# Patient Record
Sex: FEMALE | Race: WHITE | NOT HISPANIC OR LATINO | URBAN - METROPOLITAN AREA
[De-identification: names, ages, dates, MRNs, and addresses within clinical notes are randomized per-mention and may not be internally consistent; named-entity substitution may affect disease eponyms.]

---

## 2020-10-13 ENCOUNTER — OFFICE VISIT (OUTPATIENT)
Dept: URBAN - METROPOLITAN AREA CLINIC 98 | Facility: CLINIC | Age: 80
End: 2020-10-13
Payer: MEDICARE

## 2020-10-13 ENCOUNTER — WEB ENCOUNTER (OUTPATIENT)
Dept: URBAN - METROPOLITAN AREA CLINIC 98 | Facility: CLINIC | Age: 80
End: 2020-10-13

## 2020-10-13 VITALS
WEIGHT: 109.4 LBS | TEMPERATURE: 97.2 F | BODY MASS INDEX: 20.65 KG/M2 | DIASTOLIC BLOOD PRESSURE: 72 MMHG | SYSTOLIC BLOOD PRESSURE: 121 MMHG | HEART RATE: 71 BPM | HEIGHT: 61 IN

## 2020-10-13 DIAGNOSIS — K20.90 ESOPHAGITIS: ICD-10-CM

## 2020-10-13 DIAGNOSIS — Z12.11 COLON CANCER SCREENING: ICD-10-CM

## 2020-10-13 DIAGNOSIS — K21.9 GERD: ICD-10-CM

## 2020-10-13 DIAGNOSIS — K58.9 IBS (IRRITABLE BOWEL SYNDROME): ICD-10-CM

## 2020-10-13 DIAGNOSIS — D64.9 ANEMIA: ICD-10-CM

## 2020-10-13 DIAGNOSIS — K92.1 BLOOD IN STOOL: ICD-10-CM

## 2020-10-13 DIAGNOSIS — R19.7 DIARRHEA: ICD-10-CM

## 2020-10-13 PROCEDURE — G9903 PT SCRN TBCO ID AS NON USER: HCPCS | Performed by: INTERNAL MEDICINE

## 2020-10-13 PROCEDURE — G9622 NO UNHEAL ETOH USER: HCPCS | Performed by: INTERNAL MEDICINE

## 2020-10-13 PROCEDURE — G8427 DOCREV CUR MEDS BY ELIG CLIN: HCPCS | Performed by: INTERNAL MEDICINE

## 2020-10-13 PROCEDURE — 99214 OFFICE O/P EST MOD 30 MIN: CPT | Performed by: INTERNAL MEDICINE

## 2020-10-13 PROCEDURE — G8482 FLU IMMUNIZE ORDER/ADMIN: HCPCS | Performed by: INTERNAL MEDICINE

## 2020-10-13 PROCEDURE — G8420 CALC BMI NORM PARAMETERS: HCPCS | Performed by: INTERNAL MEDICINE

## 2020-10-13 PROCEDURE — 3017F COLORECTAL CA SCREEN DOC REV: CPT | Performed by: INTERNAL MEDICINE

## 2020-10-13 RX ORDER — PITAVASTATIN CALCIUM 2.09 MG/1
TABLET, FILM COATED ORAL
Qty: 0 | Refills: 0 | Status: ACTIVE | COMMUNITY
Start: 1900-01-01

## 2020-10-13 RX ORDER — DEXLANSOPRAZOLE 60 MG/1
1 CAPSULE CAPSULE, DELAYED RELEASE ORAL ONCE A DAY
Qty: 90 | Refills: 3 | OUTPATIENT
Start: 2020-10-13

## 2020-10-13 RX ORDER — VALACYCLOVIR HCL 500 MG
TABLET ORAL
Qty: 0 | Refills: 0 | Status: ACTIVE | COMMUNITY
Start: 1900-01-01

## 2020-10-13 RX ORDER — LOSARTAN POTASSIUM 25 MG/1
TABLET, FILM COATED ORAL
Qty: 0 | Refills: 0 | Status: ACTIVE | COMMUNITY
Start: 1900-01-01

## 2020-10-13 RX ORDER — ASPIRIN 81 MG/1
TABLET, CHEWABLE ORAL
Qty: 0 | Refills: 0 | Status: ACTIVE | COMMUNITY
Start: 1900-01-01

## 2020-10-13 RX ORDER — MOMETASONE FUROATE AND FORMOTEROL FUMARATE DIHYDRATE 100; 5 UG/1; UG/1
AEROSOL RESPIRATORY (INHALATION)
Qty: 0 | Refills: 0 | Status: ACTIVE | COMMUNITY
Start: 1900-01-01

## 2020-10-13 NOTE — HPI-TODAY'S VISIT:
f/u visit. Had concerns Nexium was causing loose stools, and tried to stop, however, she is now back on it Interested in Dexilant Started some benefiber    . PRIOR VISIT: Recently had blood work with Dr. Smyth. Reports labs mostly normal Taking Nexium daily Also reports she is taking meds with full glass of water to prevent pill esophagitis Has loose stools depending on her diet Overall, she feels well . PRIOR VISIT: Today she reports doing fairly well. Taking Nexium daily She alternates b/w constipation and diarrhea She does report that a few weeks ago she coughed up blood once PRIOR VISIT: Reports having 3-5 loose stools daily Has kept a food journal. Thinks she has lactose intolerance No incontinence +ve urgency Has had an episode of red blood No eugene BRBPR Tried benefiber for, but this increased her stools PRIOR VISIT: After last visit, Hgb 9.7, which was stable from hospital discharge. No rectal bleeding Feeling much better Drinking plenty of fluid with pills Taking iron supplement On PPI once daily, but reports she wants to stop the medication as she does not like PPI. She alternates between constipation and loose stools. Keeping a food journal, but has not identified inciting factors. Has not eliminated dairly products. More frequent constipation than loose stools. She had an episode of incontinence after several doses of miralax. PRIOR VISIT: [facility] GERD, HH, skin cancer, and MI Recently at [facility] with symptomatic anemia EGD with LA D esophagitis. ? reflux vs possible pill esophagitis Barium swallow with moderate HH She started BID PPI and Carafate. Stopped Carafate due to "tongue reaction" She has decreased PPI to once daily due to concerns it can cause diarrhea She has been feeling well since discharge. Avoiding inciting foods. No melena She now feels constipated on once daily PPI Drinking plenty of fluids with pills

## 2020-12-15 ENCOUNTER — OFFICE VISIT (OUTPATIENT)
Dept: URBAN - METROPOLITAN AREA CLINIC 98 | Facility: CLINIC | Age: 80
End: 2020-12-15

## 2020-12-23 ENCOUNTER — OFFICE VISIT (OUTPATIENT)
Dept: URBAN - METROPOLITAN AREA CLINIC 115 | Facility: CLINIC | Age: 80
End: 2020-12-23

## 2021-04-06 ENCOUNTER — OFFICE VISIT (OUTPATIENT)
Dept: URBAN - METROPOLITAN AREA CLINIC 98 | Facility: CLINIC | Age: 81
End: 2021-04-06

## 2021-04-13 ENCOUNTER — OFFICE VISIT (OUTPATIENT)
Dept: URBAN - METROPOLITAN AREA CLINIC 98 | Facility: CLINIC | Age: 81
End: 2021-04-13
Payer: MEDICARE

## 2021-04-13 VITALS
SYSTOLIC BLOOD PRESSURE: 164 MMHG | WEIGHT: 110.8 LBS | TEMPERATURE: 96.9 F | HEART RATE: 66 BPM | BODY MASS INDEX: 20.92 KG/M2 | DIASTOLIC BLOOD PRESSURE: 67 MMHG | HEIGHT: 61 IN

## 2021-04-13 DIAGNOSIS — K58.9 IBS (IRRITABLE BOWEL SYNDROME): ICD-10-CM

## 2021-04-13 DIAGNOSIS — K62.5 BRBPR (BRIGHT RED BLOOD PER RECTUM): ICD-10-CM

## 2021-04-13 DIAGNOSIS — Z12.11 COLON CANCER SCREENING: ICD-10-CM

## 2021-04-13 DIAGNOSIS — D64.9 ANEMIA: ICD-10-CM

## 2021-04-13 DIAGNOSIS — R19.7 DIARRHEA: ICD-10-CM

## 2021-04-13 DIAGNOSIS — K92.1 BLOOD IN STOOL: ICD-10-CM

## 2021-04-13 DIAGNOSIS — K21.9 GERD: ICD-10-CM

## 2021-04-13 DIAGNOSIS — K20.90 ESOPHAGITIS: ICD-10-CM

## 2021-04-13 PROCEDURE — G9903 PT SCRN TBCO ID AS NON USER: HCPCS | Performed by: INTERNAL MEDICINE

## 2021-04-13 PROCEDURE — G8420 CALC BMI NORM PARAMETERS: HCPCS | Performed by: INTERNAL MEDICINE

## 2021-04-13 PROCEDURE — G8482 FLU IMMUNIZE ORDER/ADMIN: HCPCS | Performed by: INTERNAL MEDICINE

## 2021-04-13 PROCEDURE — G8427 DOCREV CUR MEDS BY ELIG CLIN: HCPCS | Performed by: INTERNAL MEDICINE

## 2021-04-13 PROCEDURE — 99214 OFFICE O/P EST MOD 30 MIN: CPT | Performed by: INTERNAL MEDICINE

## 2021-04-13 PROCEDURE — 3017F COLORECTAL CA SCREEN DOC REV: CPT | Performed by: INTERNAL MEDICINE

## 2021-04-13 RX ORDER — VALACYCLOVIR HCL 500 MG
TABLET ORAL
Qty: 0 | Refills: 0 | Status: ACTIVE | COMMUNITY
Start: 1900-01-01

## 2021-04-13 RX ORDER — MOMETASONE FUROATE AND FORMOTEROL FUMARATE DIHYDRATE 100; 5 UG/1; UG/1
AEROSOL RESPIRATORY (INHALATION)
Qty: 0 | Refills: 0 | Status: ACTIVE | COMMUNITY
Start: 1900-01-01

## 2021-04-13 RX ORDER — PITAVASTATIN CALCIUM 2.09 MG/1
TABLET, FILM COATED ORAL
Qty: 0 | Refills: 0 | Status: ACTIVE | COMMUNITY
Start: 1900-01-01

## 2021-04-13 RX ORDER — ASPIRIN 81 MG/1
TABLET, CHEWABLE ORAL
Qty: 0 | Refills: 0 | Status: ACTIVE | COMMUNITY
Start: 1900-01-01

## 2021-04-13 RX ORDER — LOSARTAN POTASSIUM 25 MG/1
TABLET, FILM COATED ORAL
Qty: 0 | Refills: 0 | Status: ACTIVE | COMMUNITY
Start: 1900-01-01

## 2021-04-13 RX ORDER — DEXLANSOPRAZOLE 60 MG/1
1 CAPSULE CAPSULE, DELAYED RELEASE ORAL ONCE A DAY
Qty: 90 | Refills: 3 | Status: ACTIVE | COMMUNITY
Start: 2020-10-13

## 2021-04-13 NOTE — HPI-TODAY'S VISIT:
f/u visit. At last visit we discuss Dexilant, however, she decided to stay with Nexium In March she had an episode of BRBPR.   She was having multiple episodes of loose stool, and then saw blood.   No further episdoes No pain she can recall She is unsure when last colonoscopy     . PRIOR VISIT: Had concerns Nexium was causing loose stools, and tried to stop, however, she is now back on it Interested in Dexilant Started some benefiber    . PRIOR VISIT: Recently had blood work with Dr. Smyth. Reports labs mostly normal Taking Nexium daily Also reports she is taking meds with full glass of water to prevent pill esophagitis Has loose stools depending on her diet Overall, she feels well . PRIOR VISIT: Today she reports doing fairly well. Taking Nexium daily She alternates b/w constipation and diarrhea She does report that a few weeks ago she coughed up blood once PRIOR VISIT: Reports having 3-5 loose stools daily Has kept a food journal. Thinks she has lactose intolerance No incontinence +ve urgency Has had an episode of red blood No eugene BRBPR Tried benefiber for, but this increased her stools . PRIOR VISIT: After last visit, Hgb 9.7, which was stable from hospital discharge. No rectal bleeding Feeling much better Drinking plenty of fluid with pills Taking iron supplement On PPI once daily, but reports she wants to stop the medication as she does not like PPI. She alternates between constipation and loose stools. Keeping a food journal, but has not identified inciting factors. Has not eliminated dairly products. More frequent constipation than loose stools. She had an episode of incontinence after several doses of miralax. . PRIOR VISIT: [facility] GERD, HH, skin cancer, and MI Recently at [facility] with symptomatic anemia EGD with LA D esophagitis. ? reflux vs possible pill esophagitis Barium swallow with moderate HH She started BID PPI and Carafate. Stopped Carafate due to "tongue reaction" She has decreased PPI to once daily due to concerns it can cause diarrhea She has been feeling well since discharge. Avoiding inciting foods. No melena She now feels constipated on once daily PPI Drinking plenty of fluids with pills

## 2021-04-23 ENCOUNTER — TELEPHONE ENCOUNTER (OUTPATIENT)
Dept: URBAN - METROPOLITAN AREA CLINIC 6 | Facility: CLINIC | Age: 81
End: 2021-04-23

## 2021-04-23 RX ORDER — LOSARTAN POTASSIUM 25 MG/1
TABLET, FILM COATED ORAL
Qty: 0 | Refills: 0 | Status: ACTIVE | COMMUNITY
Start: 1900-01-01

## 2021-04-23 RX ORDER — POLYETHYLENE GLYCOL 3350, SODIUM SULFATE, SODIUM CHLORIDE, POTASSIUM CHLORIDE, ASCORBIC ACID, SODIUM ASCORBATE 140-9-5.2G
AS DIRECTED KIT ORAL
Qty: 1 KIT | Refills: 0 | OUTPATIENT
Start: 2021-04-28 | End: 2021-04-29

## 2021-04-23 RX ORDER — ASPIRIN 81 MG/1
TABLET, CHEWABLE ORAL
Qty: 0 | Refills: 0 | Status: ACTIVE | COMMUNITY
Start: 1900-01-01

## 2021-04-23 RX ORDER — VALACYCLOVIR HCL 500 MG
TABLET ORAL
Qty: 0 | Refills: 0 | Status: ACTIVE | COMMUNITY
Start: 1900-01-01

## 2021-04-23 RX ORDER — DEXLANSOPRAZOLE 60 MG/1
1 CAPSULE CAPSULE, DELAYED RELEASE ORAL ONCE A DAY
Qty: 90 | Refills: 3 | Status: ACTIVE | COMMUNITY
Start: 2020-10-13

## 2021-04-23 RX ORDER — MOMETASONE FUROATE AND FORMOTEROL FUMARATE DIHYDRATE 100; 5 UG/1; UG/1
AEROSOL RESPIRATORY (INHALATION)
Qty: 0 | Refills: 0 | Status: ACTIVE | COMMUNITY
Start: 1900-01-01

## 2021-04-23 RX ORDER — PITAVASTATIN CALCIUM 2.09 MG/1
TABLET, FILM COATED ORAL
Qty: 0 | Refills: 0 | Status: ACTIVE | COMMUNITY
Start: 1900-01-01

## 2021-04-28 ENCOUNTER — LAB OUTSIDE AN ENCOUNTER (OUTPATIENT)
Dept: URBAN - METROPOLITAN AREA CLINIC 6 | Facility: CLINIC | Age: 81
End: 2021-04-28

## 2021-04-30 PROBLEM — 74474003: Status: ACTIVE | Noted: 2021-04-13

## 2021-05-14 ENCOUNTER — OFFICE VISIT (OUTPATIENT)
Dept: URBAN - METROPOLITAN AREA MEDICAL CENTER 28 | Facility: MEDICAL CENTER | Age: 81
End: 2021-05-14
Payer: MEDICARE

## 2021-05-14 ENCOUNTER — TELEPHONE ENCOUNTER (OUTPATIENT)
Dept: URBAN - METROPOLITAN AREA CLINIC 23 | Facility: CLINIC | Age: 81
End: 2021-05-14

## 2021-05-14 DIAGNOSIS — K62.5 ANAL BLEEDING: ICD-10-CM

## 2021-05-14 DIAGNOSIS — K20.80 ESOPHAGITIS, LOS ANGELES GRADE A: ICD-10-CM

## 2021-05-14 DIAGNOSIS — K31.89 ACQUIRED DEFORMITY OF DUODENUM: ICD-10-CM

## 2021-05-14 PROCEDURE — 45378 DIAGNOSTIC COLONOSCOPY: CPT | Performed by: INTERNAL MEDICINE

## 2021-05-14 PROCEDURE — 43239 EGD BIOPSY SINGLE/MULTIPLE: CPT | Performed by: INTERNAL MEDICINE

## 2021-05-14 RX ORDER — VALACYCLOVIR HCL 500 MG
TABLET ORAL
Qty: 0 | Refills: 0 | Status: ACTIVE | COMMUNITY
Start: 1900-01-01

## 2021-05-14 RX ORDER — MOMETASONE FUROATE AND FORMOTEROL FUMARATE DIHYDRATE 100; 5 UG/1; UG/1
AEROSOL RESPIRATORY (INHALATION)
Qty: 0 | Refills: 0 | Status: ACTIVE | COMMUNITY
Start: 1900-01-01

## 2021-05-14 RX ORDER — ASPIRIN 81 MG/1
TABLET, CHEWABLE ORAL
Qty: 0 | Refills: 0 | Status: ACTIVE | COMMUNITY
Start: 1900-01-01

## 2021-05-14 RX ORDER — DEXLANSOPRAZOLE 60 MG/1
1 CAPSULE CAPSULE, DELAYED RELEASE ORAL ONCE A DAY
Qty: 90 | Refills: 3 | Status: ACTIVE | COMMUNITY
Start: 2020-10-13

## 2021-05-14 RX ORDER — ESOMEPRAZOLE MAGNESIUM 40 MG/1
1 CAPSULE CAPSULE, DELAYED RELEASE ORAL
Qty: 180 | Refills: 3 | OUTPATIENT
Start: 2021-05-14

## 2021-05-14 RX ORDER — PITAVASTATIN CALCIUM 2.09 MG/1
TABLET, FILM COATED ORAL
Qty: 0 | Refills: 0 | Status: ACTIVE | COMMUNITY
Start: 1900-01-01

## 2021-05-14 RX ORDER — LOSARTAN POTASSIUM 25 MG/1
TABLET, FILM COATED ORAL
Qty: 0 | Refills: 0 | Status: ACTIVE | COMMUNITY
Start: 1900-01-01

## 2021-05-17 ENCOUNTER — TELEPHONE ENCOUNTER (OUTPATIENT)
Dept: URBAN - METROPOLITAN AREA CLINIC 98 | Facility: CLINIC | Age: 81
End: 2021-05-17

## 2021-06-07 ENCOUNTER — OFFICE VISIT (OUTPATIENT)
Dept: URBAN - METROPOLITAN AREA CLINIC 98 | Facility: CLINIC | Age: 81
End: 2021-06-07
Payer: MEDICARE

## 2021-06-07 VITALS
SYSTOLIC BLOOD PRESSURE: 182 MMHG | WEIGHT: 106 LBS | HEIGHT: 61 IN | HEART RATE: 66 BPM | DIASTOLIC BLOOD PRESSURE: 75 MMHG | TEMPERATURE: 97.9 F | BODY MASS INDEX: 20.01 KG/M2

## 2021-06-07 DIAGNOSIS — D64.9 ANEMIA: ICD-10-CM

## 2021-06-07 DIAGNOSIS — K92.1 BLOOD IN STOOL: ICD-10-CM

## 2021-06-07 DIAGNOSIS — K21.9 GERD: ICD-10-CM

## 2021-06-07 DIAGNOSIS — K58.9 IBS (IRRITABLE BOWEL SYNDROME): ICD-10-CM

## 2021-06-07 DIAGNOSIS — K20.90 ESOPHAGITIS: ICD-10-CM

## 2021-06-07 DIAGNOSIS — R19.7 DIARRHEA: ICD-10-CM

## 2021-06-07 DIAGNOSIS — Z12.11 COLON CANCER SCREENING: ICD-10-CM

## 2021-06-07 PROCEDURE — G8420 CALC BMI NORM PARAMETERS: HCPCS | Performed by: INTERNAL MEDICINE

## 2021-06-07 PROCEDURE — G9903 PT SCRN TBCO ID AS NON USER: HCPCS | Performed by: INTERNAL MEDICINE

## 2021-06-07 PROCEDURE — G8427 DOCREV CUR MEDS BY ELIG CLIN: HCPCS | Performed by: INTERNAL MEDICINE

## 2021-06-07 PROCEDURE — G8482 FLU IMMUNIZE ORDER/ADMIN: HCPCS | Performed by: INTERNAL MEDICINE

## 2021-06-07 PROCEDURE — 99213 OFFICE O/P EST LOW 20 MIN: CPT | Performed by: INTERNAL MEDICINE

## 2021-06-07 PROCEDURE — 3017F COLORECTAL CA SCREEN DOC REV: CPT | Performed by: INTERNAL MEDICINE

## 2021-06-07 RX ORDER — ESOMEPRAZOLE MAGNESIUM 40 MG/1
1 CAPSULE CAPSULE, DELAYED RELEASE ORAL ONCE A DAY
Status: ACTIVE | COMMUNITY

## 2021-06-07 RX ORDER — ASPIRIN 81 MG/1
TABLET, CHEWABLE ORAL
Qty: 0 | Refills: 0 | Status: ACTIVE | COMMUNITY
Start: 1900-01-01

## 2021-06-07 RX ORDER — MOMETASONE FUROATE AND FORMOTEROL FUMARATE DIHYDRATE 100; 5 UG/1; UG/1
AEROSOL RESPIRATORY (INHALATION)
Qty: 0 | Refills: 0 | Status: ACTIVE | COMMUNITY
Start: 1900-01-01

## 2021-06-07 RX ORDER — VALACYCLOVIR HCL 500 MG
TABLET ORAL
Qty: 0 | Refills: 0 | Status: ACTIVE | COMMUNITY
Start: 1900-01-01

## 2021-06-07 RX ORDER — LOSARTAN POTASSIUM 25 MG/1
TABLET, FILM COATED ORAL
Qty: 0 | Refills: 0 | Status: ON HOLD | COMMUNITY
Start: 1900-01-01

## 2021-06-07 RX ORDER — PITAVASTATIN CALCIUM 2.09 MG/1
TABLET, FILM COATED ORAL
Qty: 0 | Refills: 0 | Status: ACTIVE | COMMUNITY
Start: 1900-01-01

## 2021-06-07 RX ORDER — ESOMEPRAZOLE MAGNESIUM 40 MG/1
1 CAPSULE CAPSULE, DELAYED RELEASE ORAL
Qty: 180 | Refills: 3 | Status: ON HOLD | COMMUNITY
Start: 2021-05-14

## 2021-06-07 RX ORDER — DEXLANSOPRAZOLE 60 MG/1
1 CAPSULE CAPSULE, DELAYED RELEASE ORAL ONCE A DAY
Qty: 90 | Refills: 3 | Status: ON HOLD | COMMUNITY
Start: 2020-10-13

## 2021-06-07 NOTE — HPI-TODAY'S VISIT:
f/u visit. She is feeling very well since changing her Losartan to Amlodipine EGD/Colonoscopy reviewed.  LA C esophagitis was seen She is taking Nexium once a day Colonoscopy unremarkable     . PRIOR VISIT: At last visit we discuss Dexilant, however, she decided to stay with Nexium In March she had an episode of BRBPR.   She was having multiple episodes of loose stool, and then saw blood.   No further episdoes No pain she can recall She is unsure when last colonoscopy     . PRIOR VISIT: Had concerns Nexium was causing loose stools, and tried to stop, however, she is now back on it Interested in Dexilant Started some benefiber    . PRIOR VISIT: Recently had blood work with Dr. Smyth. Reports labs mostly normal Taking Nexium daily Also reports she is taking meds with full glass of water to prevent pill esophagitis Has loose stools depending on her diet Overall, she feels well . PRIOR VISIT: Today she reports doing fairly well. Taking Nexium daily She alternates b/w constipation and diarrhea She does report that a few weeks ago she coughed up blood once PRIOR VISIT: Reports having 3-5 loose stools daily Has kept a food journal. Thinks she has lactose intolerance No incontinence +ve urgency Has had an episode of red blood No eugene BRBPR Tried benefiber for, but this increased her stools . PRIOR VISIT: After last visit, Hgb 9.7, which was stable from hospital discharge. No rectal bleeding Feeling much better Drinking plenty of fluid with pills Taking iron supplement On PPI once daily, but reports she wants to stop the medication as she does not like PPI. She alternates between constipation and loose stools. Keeping a food journal, but has not identified inciting factors. Has not eliminated dairly products. More frequent constipation than loose stools. She had an episode of incontinence after several doses of miralax. . PRIOR VISIT: [facility] GERD, HH, skin cancer, and MI Recently at [facility] with symptomatic anemia EGD with LA D esophagitis. ? reflux vs possible pill esophagitis Barium swallow with moderate HH She started BID PPI and Carafate. Stopped Carafate due to "tongue reaction" She has decreased PPI to once daily due to concerns it can cause diarrhea She has been feeling well since discharge. Avoiding inciting foods. No melena She now feels constipated on once daily PPI Drinking plenty of fluids with pills

## 2022-06-06 ENCOUNTER — WEB ENCOUNTER (OUTPATIENT)
Dept: URBAN - METROPOLITAN AREA CLINIC 98 | Facility: CLINIC | Age: 82
End: 2022-06-06

## 2022-06-06 ENCOUNTER — OFFICE VISIT (OUTPATIENT)
Dept: URBAN - METROPOLITAN AREA CLINIC 98 | Facility: CLINIC | Age: 82
End: 2022-06-06
Payer: MEDICARE

## 2022-06-06 VITALS
DIASTOLIC BLOOD PRESSURE: 62 MMHG | HEART RATE: 65 BPM | SYSTOLIC BLOOD PRESSURE: 124 MMHG | BODY MASS INDEX: 20.42 KG/M2 | TEMPERATURE: 97.1 F | HEIGHT: 60 IN | WEIGHT: 104 LBS

## 2022-06-06 DIAGNOSIS — K59.00 ACUTE CONSTIPATION: ICD-10-CM

## 2022-06-06 DIAGNOSIS — K92.1 BLOOD IN STOOL: ICD-10-CM

## 2022-06-06 DIAGNOSIS — K21.9 GERD: ICD-10-CM

## 2022-06-06 DIAGNOSIS — R19.7 DIARRHEA: ICD-10-CM

## 2022-06-06 DIAGNOSIS — K20.90 ESOPHAGITIS: ICD-10-CM

## 2022-06-06 DIAGNOSIS — K58.9 IBS (IRRITABLE BOWEL SYNDROME): ICD-10-CM

## 2022-06-06 DIAGNOSIS — Z12.11 COLON CANCER SCREENING: ICD-10-CM

## 2022-06-06 DIAGNOSIS — D64.9 ANEMIA: ICD-10-CM

## 2022-06-06 PROCEDURE — G8427 DOCREV CUR MEDS BY ELIG CLIN: HCPCS | Performed by: INTERNAL MEDICINE

## 2022-06-06 PROCEDURE — 3017F COLORECTAL CA SCREEN DOC REV: CPT | Performed by: INTERNAL MEDICINE

## 2022-06-06 PROCEDURE — G8482 FLU IMMUNIZE ORDER/ADMIN: HCPCS | Performed by: INTERNAL MEDICINE

## 2022-06-06 PROCEDURE — G9903 PT SCRN TBCO ID AS NON USER: HCPCS | Performed by: INTERNAL MEDICINE

## 2022-06-06 PROCEDURE — G8420 CALC BMI NORM PARAMETERS: HCPCS | Performed by: INTERNAL MEDICINE

## 2022-06-06 PROCEDURE — 99213 OFFICE O/P EST LOW 20 MIN: CPT | Performed by: INTERNAL MEDICINE

## 2022-06-06 RX ORDER — VALACYCLOVIR HCL 500 MG
TABLET ORAL
Qty: 0 | Refills: 0 | Status: ACTIVE | COMMUNITY
Start: 1900-01-01

## 2022-06-06 RX ORDER — LOSARTAN POTASSIUM 25 MG/1
TABLET, FILM COATED ORAL
Qty: 0 | Refills: 0 | Status: ON HOLD | COMMUNITY
Start: 1900-01-01

## 2022-06-06 RX ORDER — ESOMEPRAZOLE MAGNESIUM 40 MG/1
1 CAPSULE CAPSULE, DELAYED RELEASE ORAL
Qty: 180 | Refills: 3 | Status: ON HOLD | COMMUNITY
Start: 2021-05-14

## 2022-06-06 RX ORDER — DEXLANSOPRAZOLE 60 MG/1
1 CAPSULE CAPSULE, DELAYED RELEASE ORAL ONCE A DAY
Qty: 90 | Refills: 3 | Status: ON HOLD | COMMUNITY
Start: 2020-10-13

## 2022-06-06 RX ORDER — ESOMEPRAZOLE MAGNESIUM 40 MG/1
1 CAPSULE CAPSULE, DELAYED RELEASE ORAL ONCE A DAY
Status: ACTIVE | COMMUNITY

## 2022-06-06 RX ORDER — ASPIRIN 81 MG/1
TABLET, CHEWABLE ORAL
Qty: 0 | Refills: 0 | Status: ACTIVE | COMMUNITY
Start: 1900-01-01

## 2022-06-06 RX ORDER — MOMETASONE FUROATE AND FORMOTEROL FUMARATE DIHYDRATE 100; 5 UG/1; UG/1
AEROSOL RESPIRATORY (INHALATION)
Qty: 0 | Refills: 0 | Status: ACTIVE | COMMUNITY
Start: 1900-01-01

## 2022-06-06 RX ORDER — PITAVASTATIN CALCIUM 2.09 MG/1
TABLET, FILM COATED ORAL
Qty: 0 | Refills: 0 | Status: ACTIVE | COMMUNITY
Start: 1900-01-01

## 2022-06-06 NOTE — HPI-TODAY'S VISIT:
f/u visit. Doing well on Nexium once a day No heartburn . Today she reports some intermittent constipation leading to hard bowel movements and occ pre-syncope if she has not had a bowel movement Denies straining with bowel movements Symptoms resolve after bowel movement No issues if she is having regular bowel movements     . PRIOR VISIT: She is feeling very well since changing her Losartan to Amlodipine EGD/Colonoscopy reviewed.  LA C esophagitis was seen She is taking Nexium once a day Colonoscopy unremarkable   . PRIOR VISIT: At last visit we discuss Dexilant, however, she decided to stay with Nexium In March she had an episode of BRBPR.   She was having multiple episodes of loose stool, and then saw blood.   No further episdoes No pain she can recall She is unsure when last colonoscopy     . PRIOR VISIT: Had concerns Nexium was causing loose stools, and tried to stop, however, she is now back on it Interested in Dexilant Started some benefiber    . PRIOR VISIT: Recently had blood work with Dr. Smyth. Reports labs mostly normal Taking Nexium daily Also reports she is taking meds with full glass of water to prevent pill esophagitis Has loose stools depending on her diet Overall, she feels well . PRIOR VISIT: Today she reports doing fairly well. Taking Nexium daily She alternates b/w constipation and diarrhea She does report that a few weeks ago she coughed up blood once PRIOR VISIT: Reports having 3-5 loose stools daily Has kept a food journal. Thinks she has lactose intolerance No incontinence +ve urgency Has had an episode of red blood No eugene BRBPR Tried benefiber for, but this increased her stools . PRIOR VISIT: After last visit, Hgb 9.7, which was stable from hospital discharge. No rectal bleeding Feeling much better Drinking plenty of fluid with pills Taking iron supplement On PPI once daily, but reports she wants to stop the medication as she does not like PPI. She alternates between constipation and loose stools. Keeping a food journal, but has not identified inciting factors. Has not eliminated dairly products. More frequent constipation than loose stools. She had an episode of incontinence after several doses of miralax. . PRIOR VISIT: [facility] GERD, HH, skin cancer, and MI Recently at [facility] with symptomatic anemia EGD with LA D esophagitis. ? reflux vs possible pill esophagitis Barium swallow with moderate HH She started BID PPI and Carafate. Stopped Carafate due to "tongue reaction" She has decreased PPI to once daily due to concerns it can cause diarrhea She has been feeling well since discharge. Avoiding inciting foods. No melena She now feels constipated on once daily PPI Drinking plenty of fluids with pills

## 2022-08-22 ENCOUNTER — TELEPHONE ENCOUNTER (OUTPATIENT)
Dept: URBAN - METROPOLITAN AREA CLINIC 98 | Facility: CLINIC | Age: 82
End: 2022-08-22

## 2022-08-22 RX ORDER — ESOMEPRAZOLE MAGNESIUM 40 MG/1
1 CAPSULE CAPSULE, DELAYED RELEASE ORAL ONCE A DAY
Qty: 90 | Refills: 3

## 2023-01-10 ENCOUNTER — OFFICE VISIT (OUTPATIENT)
Dept: URBAN - METROPOLITAN AREA CLINIC 98 | Facility: CLINIC | Age: 83
End: 2023-01-10
Payer: MEDICARE

## 2023-01-10 VITALS
HEART RATE: 79 BPM | DIASTOLIC BLOOD PRESSURE: 63 MMHG | HEIGHT: 60 IN | TEMPERATURE: 97 F | BODY MASS INDEX: 20.73 KG/M2 | WEIGHT: 105.6 LBS | SYSTOLIC BLOOD PRESSURE: 113 MMHG

## 2023-01-10 DIAGNOSIS — K92.1 BLOOD IN STOOL: ICD-10-CM

## 2023-01-10 DIAGNOSIS — Z12.11 COLON CANCER SCREENING: ICD-10-CM

## 2023-01-10 DIAGNOSIS — K62.9 RECTAL LESION: ICD-10-CM

## 2023-01-10 DIAGNOSIS — K58.9 IBS (IRRITABLE BOWEL SYNDROME): ICD-10-CM

## 2023-01-10 DIAGNOSIS — K20.90 ESOPHAGITIS: ICD-10-CM

## 2023-01-10 DIAGNOSIS — K59.00 ACUTE CONSTIPATION: ICD-10-CM

## 2023-01-10 DIAGNOSIS — R19.7 DIARRHEA: ICD-10-CM

## 2023-01-10 DIAGNOSIS — D64.9 ANEMIA: ICD-10-CM

## 2023-01-10 DIAGNOSIS — K21.9 GERD: ICD-10-CM

## 2023-01-10 PROBLEM — 10743008 IRRITABLE BOWEL SYNDROME: Status: ACTIVE | Noted: 2020-10-13

## 2023-01-10 PROBLEM — 16761005 ESOPHAGITIS: Status: ACTIVE | Noted: 2020-10-13

## 2023-01-10 PROBLEM — 197119006: Status: ACTIVE | Noted: 2022-06-06

## 2023-01-10 PROBLEM — 300312006: Status: ACTIVE | Noted: 2023-01-10

## 2023-01-10 PROBLEM — 235595009 GASTROESOPHAGEAL REFLUX DISEASE: Status: ACTIVE | Noted: 2020-10-13

## 2023-01-10 PROCEDURE — 99214 OFFICE O/P EST MOD 30 MIN: CPT | Performed by: INTERNAL MEDICINE

## 2023-01-10 PROCEDURE — G8427 DOCREV CUR MEDS BY ELIG CLIN: HCPCS | Performed by: INTERNAL MEDICINE

## 2023-01-10 PROCEDURE — G8482 FLU IMMUNIZE ORDER/ADMIN: HCPCS | Performed by: INTERNAL MEDICINE

## 2023-01-10 PROCEDURE — 3017F COLORECTAL CA SCREEN DOC REV: CPT | Performed by: INTERNAL MEDICINE

## 2023-01-10 PROCEDURE — G9903 PT SCRN TBCO ID AS NON USER: HCPCS | Performed by: INTERNAL MEDICINE

## 2023-01-10 PROCEDURE — G8420 CALC BMI NORM PARAMETERS: HCPCS | Performed by: INTERNAL MEDICINE

## 2023-01-10 RX ORDER — ESOMEPRAZOLE MAGNESIUM 40 MG/1
1 CAPSULE CAPSULE, DELAYED RELEASE ORAL
Qty: 180 | Refills: 3 | Status: ACTIVE | COMMUNITY
Start: 2021-05-14

## 2023-01-10 RX ORDER — LOSARTAN POTASSIUM 25 MG/1
TABLET, FILM COATED ORAL
Qty: 0 | Refills: 0 | Status: ACTIVE | COMMUNITY
Start: 1900-01-01

## 2023-01-10 RX ORDER — PITAVASTATIN CALCIUM 2.09 MG/1
TABLET, FILM COATED ORAL
Qty: 0 | Refills: 0 | Status: ACTIVE | COMMUNITY
Start: 1900-01-01

## 2023-01-10 RX ORDER — MOMETASONE FUROATE AND FORMOTEROL FUMARATE DIHYDRATE 100; 5 UG/1; UG/1
AEROSOL RESPIRATORY (INHALATION)
Qty: 0 | Refills: 0 | Status: ACTIVE | COMMUNITY
Start: 1900-01-01

## 2023-01-10 RX ORDER — FLUTICASONE FUROATE AND VILANTEROL TRIFENATATE 100; 25 UG/1; UG/1
1 PUFF POWDER RESPIRATORY (INHALATION) ONCE A DAY
Status: ACTIVE | COMMUNITY

## 2023-01-10 RX ORDER — ASPIRIN 81 MG/1
TABLET, CHEWABLE ORAL
Qty: 0 | Refills: 0 | Status: ACTIVE | COMMUNITY
Start: 1900-01-01

## 2023-01-10 RX ORDER — VALACYCLOVIR HCL 500 MG
TABLET ORAL
Qty: 0 | Refills: 0 | Status: ACTIVE | COMMUNITY
Start: 1900-01-01

## 2023-01-10 RX ORDER — ESOMEPRAZOLE MAGNESIUM 40 MG/1
1 CAPSULE CAPSULE, DELAYED RELEASE ORAL ONCE A DAY
Qty: 90 | Refills: 3 | Status: ACTIVE | COMMUNITY

## 2023-01-10 NOTE — HPI-TODAY'S VISIT:
f/u visit. Got sick last week after eating something with cream sauce Got sick at home with a large bowel movement, and felt a protrusion afterwards Since then she has returned to baseline of incomplete BMs  . PRIOR VISIT: Doing well on Nexium once a day No heartburn . Today she reports some intermittent constipation leading to hard bowel movements and occ pre-syncope if she has not had a bowel movement Denies straining with bowel movements Symptoms resolve after bowel movement No issues if she is having regular bowel movements     . PRIOR VISIT: She is feeling very well since changing her Losartan to Amlodipine EGD/Colonoscopy reviewed.  LA C esophagitis was seen She is taking Nexium once a day Colonoscopy unremarkable   . PRIOR VISIT: At last visit we discuss Dexilant, however, she decided to stay with Nexium In March she had an episode of BRBPR.   She was having multiple episodes of loose stool, and then saw blood.   No further episdoes No pain she can recall She is unsure when last colonoscopy     . PRIOR VISIT: Had concerns Nexium was causing loose stools, and tried to stop, however, she is now back on it Interested in Dexilant Started some benefiber    . PRIOR VISIT: Recently had blood work with Dr. Smyth. Reports labs mostly normal Taking Nexium daily Also reports she is taking meds with full glass of water to prevent pill esophagitis Has loose stools depending on her diet Overall, she feels well . PRIOR VISIT: Today she reports doing fairly well. Taking Nexium daily She alternates b/w constipation and diarrhea She does report that a few weeks ago she coughed up blood once PRIOR VISIT: Reports having 3-5 loose stools daily Has kept a food journal. Thinks she has lactose intolerance No incontinence +ve urgency Has had an episode of red blood No eugene BRBPR Tried benefiber for, but this increased her stools . PRIOR VISIT: After last visit, Hgb 9.7, which was stable from hospital discharge. No rectal bleeding Feeling much better Drinking plenty of fluid with pills Taking iron supplement On PPI once daily, but reports she wants to stop the medication as she does not like PPI. She alternates between constipation and loose stools. Keeping a food journal, but has not identified inciting factors. Has not eliminated dairly products. More frequent constipation than loose stools. She had an episode of incontinence after several doses of miralax. . PRIOR VISIT: [facility] GERD, HH, skin cancer, and MI Recently at [facility] with symptomatic anemia EGD with LA D esophagitis. ? reflux vs possible pill esophagitis Barium swallow with moderate HH She started BID PPI and Carafate. Stopped Carafate due to "tongue reaction" She has decreased PPI to once daily due to concerns it can cause diarrhea She has been feeling well since discharge. Avoiding inciting foods. No melena She now feels constipated on once daily PPI Drinking plenty of fluids with pills

## 2023-06-05 ENCOUNTER — LAB OUTSIDE AN ENCOUNTER (OUTPATIENT)
Dept: URBAN - METROPOLITAN AREA CLINIC 98 | Facility: CLINIC | Age: 83
End: 2023-06-05

## 2023-06-05 ENCOUNTER — OFFICE VISIT (OUTPATIENT)
Dept: URBAN - METROPOLITAN AREA CLINIC 98 | Facility: CLINIC | Age: 83
End: 2023-06-05
Payer: MEDICARE

## 2023-06-05 VITALS
SYSTOLIC BLOOD PRESSURE: 112 MMHG | WEIGHT: 104.4 LBS | DIASTOLIC BLOOD PRESSURE: 67 MMHG | TEMPERATURE: 97.4 F | BODY MASS INDEX: 20.5 KG/M2 | HEIGHT: 60 IN | HEART RATE: 70 BPM

## 2023-06-05 DIAGNOSIS — K62.9 RECTAL LESION: ICD-10-CM

## 2023-06-05 DIAGNOSIS — R63.4 WEIGHT LOSS: ICD-10-CM

## 2023-06-05 DIAGNOSIS — K20.90 ESOPHAGITIS: ICD-10-CM

## 2023-06-05 DIAGNOSIS — R09.89 GLOBUS SENSATION: ICD-10-CM

## 2023-06-05 PROBLEM — 89362005: Status: ACTIVE | Noted: 2023-06-05

## 2023-06-05 PROBLEM — 267103008: Status: ACTIVE | Noted: 2023-06-05

## 2023-06-05 PROBLEM — 57773001: Status: ACTIVE | Noted: 2023-06-05

## 2023-06-05 PROBLEM — 84089009: Status: ACTIVE | Noted: 2023-06-05

## 2023-06-05 PROCEDURE — 99214 OFFICE O/P EST MOD 30 MIN: CPT | Performed by: INTERNAL MEDICINE

## 2023-06-05 RX ORDER — FLUTICASONE FUROATE AND VILANTEROL TRIFENATATE 100; 25 UG/1; UG/1
1 PUFF POWDER RESPIRATORY (INHALATION) ONCE A DAY
Status: ACTIVE | COMMUNITY

## 2023-06-05 RX ORDER — ASPIRIN 81 MG/1
TABLET, CHEWABLE ORAL
Qty: 0 | Refills: 0 | Status: ACTIVE | COMMUNITY
Start: 1900-01-01

## 2023-06-05 RX ORDER — ESOMEPRAZOLE MAGNESIUM 40 MG/1
1 CAPSULE CAPSULE, DELAYED RELEASE ORAL
Qty: 180 | Refills: 3 | Status: ACTIVE | COMMUNITY
Start: 2021-05-14

## 2023-06-05 RX ORDER — PITAVASTATIN CALCIUM 2.09 MG/1
TABLET, FILM COATED ORAL
Qty: 0 | Refills: 0 | Status: ACTIVE | COMMUNITY
Start: 1900-01-01

## 2023-06-05 RX ORDER — MOMETASONE FUROATE AND FORMOTEROL FUMARATE DIHYDRATE 100; 5 UG/1; UG/1
AEROSOL RESPIRATORY (INHALATION)
Qty: 0 | Refills: 0 | Status: ACTIVE | COMMUNITY
Start: 1900-01-01

## 2023-06-05 RX ORDER — VALACYCLOVIR HCL 500 MG
TABLET ORAL
Qty: 0 | Refills: 0 | Status: ACTIVE | COMMUNITY
Start: 1900-01-01

## 2023-06-05 RX ORDER — ESOMEPRAZOLE MAGNESIUM 40 MG/1
1 CAPSULE CAPSULE, DELAYED RELEASE ORAL ONCE A DAY
Qty: 90 | Refills: 3 | Status: ACTIVE | COMMUNITY

## 2023-06-05 RX ORDER — LOSARTAN POTASSIUM 25 MG/1
TABLET, FILM COATED ORAL
Qty: 0 | Refills: 0 | Status: ACTIVE | COMMUNITY
Start: 1900-01-01

## 2023-06-05 NOTE — HPI-TODAY'S VISIT:
f/u visit. She did not see [facility] due to no medicare acceptance Using prunes.  Did not like benefiber in the past Recently had loose stools after eating bush's beans Does feel some protrusion after bowel movements that requires manual replacement   . PRIOR VISIT: Got sick last week after eating something with cream sauce Got sick at home with a large bowel movement, and felt a protrusion afterwards Since then she has returned to baseline of incomplete BMs  . PRIOR VISIT: Doing well on Nexium once a day No heartburn . Today she reports some intermittent constipation leading to hard bowel movements and occ pre-syncope if she has not had a bowel movement Denies straining with bowel movements Symptoms resolve after bowel movement No issues if she is having regular bowel movements     . PRIOR VISIT: She is feeling very well since changing her Losartan to Amlodipine EGD/Colonoscopy reviewed.  LA C esophagitis was seen She is taking Nexium once a day Colonoscopy unremarkable   . PRIOR VISIT: At last visit we discuss Dexilant, however, she decided to stay with Nexium In March she had an episode of BRBPR.   She was having multiple episodes of loose stool, and then saw blood.   No further episdoes No pain she can recall She is unsure when last colonoscopy     . PRIOR VISIT: Had concerns Nexium was causing loose stools, and tried to stop, however, she is now back on it Interested in Dexilant Started some benefiber    . PRIOR VISIT: Recently had blood work with Dr. Smyth. Reports labs mostly normal Taking Nexium daily Also reports she is taking meds with full glass of water to prevent pill esophagitis Has loose stools depending on her diet Overall, she feels well . PRIOR VISIT: Today she reports doing fairly well. Taking Nexium daily She alternates b/w constipation and diarrhea She does report that a few weeks ago she coughed up blood once PRIOR VISIT: Reports having 3-5 loose stools daily Has kept a food journal. Thinks she has lactose intolerance No incontinence +ve urgency Has had an episode of red blood No eugene BRBPR Tried benefiber for, but this increased her stools . PRIOR VISIT: After last visit, Hgb 9.7, which was stable from hospital discharge. No rectal bleeding Feeling much better Drinking plenty of fluid with pills Taking iron supplement On PPI once daily, but reports she wants to stop the medication as she does not like PPI. She alternates between constipation and loose stools. Keeping a food journal, but has not identified inciting factors. Has not eliminated dairly products. More frequent constipation than loose stools. She had an episode of incontinence after several doses of miralax. . PRIOR VISIT: [facility] GERD, HH, skin cancer, and MI Recently at [facility] with symptomatic anemia EGD with LA D esophagitis. ? reflux vs possible pill esophagitis Barium swallow with moderate HH She started BID PPI and Carafate. Stopped Carafate due to "tongue reaction" She has decreased PPI to once daily due to concerns it can cause diarrhea She has been feeling well since discharge. Avoiding inciting foods. No melena She now feels constipated on once daily PPI Drinking plenty of fluids with pills

## 2023-06-19 ENCOUNTER — TELEPHONE ENCOUNTER (OUTPATIENT)
Dept: URBAN - METROPOLITAN AREA CLINIC 98 | Facility: CLINIC | Age: 83
End: 2023-06-19

## 2023-06-28 ENCOUNTER — TELEPHONE ENCOUNTER (OUTPATIENT)
Dept: URBAN - METROPOLITAN AREA CLINIC 96 | Facility: CLINIC | Age: 83
End: 2023-06-28

## 2023-07-25 ENCOUNTER — TELEPHONE ENCOUNTER (OUTPATIENT)
Dept: URBAN - METROPOLITAN AREA CLINIC 98 | Facility: CLINIC | Age: 83
End: 2023-07-25

## 2023-09-05 ENCOUNTER — OFFICE VISIT (OUTPATIENT)
Dept: URBAN - METROPOLITAN AREA CLINIC 98 | Facility: CLINIC | Age: 83
End: 2023-09-05

## 2024-03-12 ENCOUNTER — OV EP (OUTPATIENT)
Dept: URBAN - METROPOLITAN AREA CLINIC 98 | Facility: CLINIC | Age: 84
End: 2024-03-12
Payer: MEDICARE

## 2024-03-12 VITALS
HEIGHT: 61 IN | HEART RATE: 64 BPM | TEMPERATURE: 97.2 F | DIASTOLIC BLOOD PRESSURE: 61 MMHG | BODY MASS INDEX: 19.33 KG/M2 | WEIGHT: 102.4 LBS | SYSTOLIC BLOOD PRESSURE: 128 MMHG

## 2024-03-12 DIAGNOSIS — K20.90 ESOPHAGITIS: ICD-10-CM

## 2024-03-12 DIAGNOSIS — K58.1 IBS: ICD-10-CM

## 2024-03-12 DIAGNOSIS — K44.9 DIAPHRAGMATIC HERNIA: ICD-10-CM

## 2024-03-12 DIAGNOSIS — K21.9 GERD: ICD-10-CM

## 2024-03-12 PROCEDURE — 99214 OFFICE O/P EST MOD 30 MIN: CPT | Performed by: INTERNAL MEDICINE

## 2024-03-12 RX ORDER — VALACYCLOVIR HCL 500 MG
TABLET ORAL
Qty: 0 | Refills: 0 | Status: ACTIVE | COMMUNITY
Start: 1900-01-01

## 2024-03-12 RX ORDER — FLUTICASONE FUROATE AND VILANTEROL TRIFENATATE 100; 25 UG/1; UG/1
1 PUFF POWDER RESPIRATORY (INHALATION) ONCE A DAY
Status: ACTIVE | COMMUNITY

## 2024-03-12 RX ORDER — ESOMEPRAZOLE MAGNESIUM 40 MG/1
1 CAPSULE CAPSULE, DELAYED RELEASE ORAL ONCE A DAY
Qty: 90 | Refills: 3 | Status: ACTIVE | COMMUNITY

## 2024-03-12 RX ORDER — MOMETASONE FUROATE AND FORMOTEROL FUMARATE DIHYDRATE 100; 5 UG/1; UG/1
AEROSOL RESPIRATORY (INHALATION)
Qty: 0 | Refills: 0 | Status: ACTIVE | COMMUNITY
Start: 1900-01-01

## 2024-03-12 RX ORDER — FAMOTIDINE 40 MG/1
1 TABLET TABLET, FILM COATED ORAL TWICE A DAY
Qty: 180 TABLET | Refills: 3 | OUTPATIENT
Start: 2024-03-12

## 2024-03-12 NOTE — HPI-TODAY'S VISIT:
f/u visit. Recently started on Inclisiran for HLD Began to notice some changes in bowel movements that were made worse with Nexium use She stopped PPI and has been feeling better Started pro-biotic  . PRIOR VISIT: She did not see [facility] due to no medicare acceptance Using prunes.  Did not like benefiber in the past Recently had loose stools after eating bush's beans Does feel some protrusion after bowel movements that requires manual replacement   . PRIOR VISIT: Got sick last week after eating something with cream sauce Got sick at home with a large bowel movement, and felt a protrusion afterwards Since then she has returned to baseline of incomplete BMs  . PRIOR VISIT: Doing well on Nexium once a day No heartburn . Today she reports some intermittent constipation leading to hard bowel movements and occ pre-syncope if she has not had a bowel movement Denies straining with bowel movements Symptoms resolve after bowel movement No issues if she is having regular bowel movements     . PRIOR VISIT: She is feeling very well since changing her Losartan to Amlodipine EGD/Colonoscopy reviewed.  LA C esophagitis was seen She is taking Nexium once a day Colonoscopy unremarkable   . PRIOR VISIT: At last visit we discuss Dexilant, however, she decided to stay with Nexium In March she had an episode of BRBPR.   She was having multiple episodes of loose stool, and then saw blood.   No further episdoes No pain she can recall She is unsure when last colonoscopy     . PRIOR VISIT: Had concerns Nexium was causing loose stools, and tried to stop, however, she is now back on it Interested in Dexilant Started some benefiber    . PRIOR VISIT: Recently had blood work with Dr. Smyth. Reports labs mostly normal Taking Nexium daily Also reports she is taking meds with full glass of water to prevent pill esophagitis Has loose stools depending on her diet Overall, she feels well . PRIOR VISIT: Today she reports doing fairly well. Taking Nexium daily She alternates b/w constipation and diarrhea She does report that a few weeks ago she coughed up blood once PRIOR VISIT: Reports having 3-5 loose stools daily Has kept a food journal. Thinks she has lactose intolerance No incontinence +ve urgency Has had an episode of red blood No eugene BRBPR Tried benefiber for, but this increased her stools . PRIOR VISIT: After last visit, Hgb 9.7, which was stable from hospital discharge. No rectal bleeding Feeling much better Drinking plenty of fluid with pills Taking iron supplement On PPI once daily, but reports she wants to stop the medication as she does not like PPI. She alternates between constipation and loose stools. Keeping a food journal, but has not identified inciting factors. Has not eliminated dairly products. More frequent constipation than loose stools. She had an episode of incontinence after several doses of miralax. . PRIOR VISIT: [facility] GERD, HH, skin cancer, and MI Recently at [facility] with symptomatic anemia EGD with LA D esophagitis. ? reflux vs possible pill esophagitis Barium swallow with moderate HH She started BID PPI and Carafate. Stopped Carafate due to "tongue reaction" She has decreased PPI to once daily due to concerns it can cause diarrhea She has been feeling well since discharge. Avoiding inciting foods. No melena She now feels constipated on once daily PPI Drinking plenty of fluids with pills

## 2024-06-28 ENCOUNTER — TELEPHONE ENCOUNTER (OUTPATIENT)
Dept: URBAN - METROPOLITAN AREA CLINIC 98 | Facility: CLINIC | Age: 84
End: 2024-06-28

## 2024-08-09 ENCOUNTER — OFFICE VISIT (OUTPATIENT)
Dept: URBAN - METROPOLITAN AREA CLINIC 98 | Facility: CLINIC | Age: 84
End: 2024-08-09

## 2024-08-09 RX ORDER — ESOMEPRAZOLE MAGNESIUM 40 MG/1
1 CAPSULE CAPSULE, DELAYED RELEASE ORAL ONCE A DAY
Qty: 90 | Refills: 3 | Status: ACTIVE | COMMUNITY

## 2024-08-09 RX ORDER — FAMOTIDINE 40 MG/1
1 TABLET TABLET, FILM COATED ORAL TWICE A DAY
Qty: 180 TABLET | Refills: 3 | Status: ACTIVE | COMMUNITY
Start: 2024-03-12

## 2024-08-09 RX ORDER — MOMETASONE FUROATE AND FORMOTEROL FUMARATE DIHYDRATE 100; 5 UG/1; UG/1
AEROSOL RESPIRATORY (INHALATION)
Qty: 0 | Refills: 0 | Status: ACTIVE | COMMUNITY
Start: 1900-01-01

## 2024-08-09 RX ORDER — VALACYCLOVIR HCL 500 MG
TABLET ORAL
Qty: 0 | Refills: 0 | Status: ACTIVE | COMMUNITY
Start: 1900-01-01

## 2024-08-09 RX ORDER — FLUTICASONE FUROATE AND VILANTEROL TRIFENATATE 100; 25 UG/1; UG/1
1 PUFF POWDER RESPIRATORY (INHALATION) ONCE A DAY
Status: ACTIVE | COMMUNITY

## 2024-08-12 ENCOUNTER — OFFICE VISIT (OUTPATIENT)
Dept: URBAN - METROPOLITAN AREA CLINIC 98 | Facility: CLINIC | Age: 84
End: 2024-08-12

## 2024-12-10 ENCOUNTER — TELEPHONE ENCOUNTER (OUTPATIENT)
Dept: URBAN - METROPOLITAN AREA CLINIC 98 | Facility: CLINIC | Age: 84
End: 2024-12-10

## 2024-12-16 ENCOUNTER — LAB OUTSIDE AN ENCOUNTER (OUTPATIENT)
Dept: URBAN - METROPOLITAN AREA TELEHEALTH 2 | Facility: TELEHEALTH | Age: 84
End: 2024-12-16

## 2024-12-16 ENCOUNTER — TELEPHONE ENCOUNTER (OUTPATIENT)
Dept: URBAN - METROPOLITAN AREA CLINIC 98 | Facility: CLINIC | Age: 84
End: 2024-12-16

## 2024-12-16 ENCOUNTER — LAB OUTSIDE AN ENCOUNTER (OUTPATIENT)
Dept: URBAN - METROPOLITAN AREA CLINIC 98 | Facility: CLINIC | Age: 84
End: 2024-12-16

## 2024-12-16 PROBLEM — 398032003: Status: ACTIVE | Noted: 2024-12-16

## 2024-12-16 PROBLEM — 87522002: Status: ACTIVE | Noted: 2024-12-16

## 2024-12-17 ENCOUNTER — DASHBOARD ENCOUNTERS (OUTPATIENT)
Age: 84
End: 2024-12-17

## 2024-12-18 ENCOUNTER — LAB OUTSIDE AN ENCOUNTER (OUTPATIENT)
Dept: URBAN - METROPOLITAN AREA TELEHEALTH 2 | Facility: TELEHEALTH | Age: 84
End: 2024-12-18

## 2024-12-18 ENCOUNTER — OFFICE VISIT (OUTPATIENT)
Dept: URBAN - METROPOLITAN AREA TELEHEALTH 2 | Facility: TELEHEALTH | Age: 84
End: 2024-12-18
Payer: MEDICARE

## 2024-12-18 ENCOUNTER — TELEPHONE ENCOUNTER (OUTPATIENT)
Dept: URBAN - METROPOLITAN AREA CLINIC 98 | Facility: CLINIC | Age: 84
End: 2024-12-18

## 2024-12-18 VITALS — WEIGHT: 90 LBS

## 2024-12-18 DIAGNOSIS — R19.7 DIARRHEA: ICD-10-CM

## 2024-12-18 DIAGNOSIS — R09.89 GLOBUS SENSATION: ICD-10-CM

## 2024-12-18 DIAGNOSIS — D50.8 OTHER IRON DEFICIENCY ANEMIAS: ICD-10-CM

## 2024-12-18 DIAGNOSIS — K20.90 ESOPHAGITIS: ICD-10-CM

## 2024-12-18 DIAGNOSIS — K92.1 BLOOD IN STOOL: ICD-10-CM

## 2024-12-18 DIAGNOSIS — R19.5 LOOSE STOOLS: ICD-10-CM

## 2024-12-18 DIAGNOSIS — Z12.11 COLON CANCER SCREENING: ICD-10-CM

## 2024-12-18 DIAGNOSIS — K62.9 RECTAL LESION: ICD-10-CM

## 2024-12-18 DIAGNOSIS — K59.00 ACUTE CONSTIPATION: ICD-10-CM

## 2024-12-18 DIAGNOSIS — K62.3 RECTAL PROLAPSE: ICD-10-CM

## 2024-12-18 DIAGNOSIS — K58.9 IBS (IRRITABLE BOWEL SYNDROME): ICD-10-CM

## 2024-12-18 DIAGNOSIS — D64.9 ANEMIA: ICD-10-CM

## 2024-12-18 DIAGNOSIS — K44.9 HIATAL HERNIA: ICD-10-CM

## 2024-12-18 DIAGNOSIS — R63.4 WEIGHT LOSS: ICD-10-CM

## 2024-12-18 DIAGNOSIS — K21.9 GERD: ICD-10-CM

## 2024-12-18 PROCEDURE — 99214 OFFICE O/P EST MOD 30 MIN: CPT | Performed by: INTERNAL MEDICINE

## 2024-12-18 RX ORDER — FAMOTIDINE 40 MG/1
1 TABLET TABLET, FILM COATED ORAL TWICE A DAY
Qty: 180 TABLET | Refills: 3 | Status: ACTIVE | COMMUNITY
Start: 2024-03-12

## 2024-12-18 RX ORDER — MOMETASONE FUROATE AND FORMOTEROL FUMARATE DIHYDRATE 100; 5 UG/1; UG/1
AEROSOL RESPIRATORY (INHALATION)
Qty: 0 | Refills: 0 | Status: ACTIVE | COMMUNITY
Start: 1900-01-01

## 2024-12-18 RX ORDER — VALACYCLOVIR HCL 500 MG
TABLET ORAL
Qty: 0 | Refills: 0 | Status: ACTIVE | COMMUNITY
Start: 1900-01-01

## 2024-12-18 RX ORDER — ESOMEPRAZOLE MAGNESIUM 40 MG/1
1 CAPSULE CAPSULE, DELAYED RELEASE ORAL ONCE A DAY
Qty: 90 | Refills: 3 | Status: ACTIVE | COMMUNITY

## 2024-12-18 RX ORDER — FLUTICASONE FUROATE AND VILANTEROL TRIFENATATE 100; 25 UG/1; UG/1
1 PUFF POWDER RESPIRATORY (INHALATION) ONCE A DAY
Status: ACTIVE | COMMUNITY

## 2024-12-18 RX ORDER — FAMOTIDINE 40 MG/1
1 TABLET TABLET, FILM COATED ORAL TWICE A DAY
Qty: 180 TABLET | Refills: 3 | OUTPATIENT

## 2024-12-18 NOTE — HPI-TODAY'S VISIT:
f/u visit. She has continued to have loose bowel movements, she remembers this starting around 11/22/24 when she went to dinner with a friend and ate lettuce Of note she had some constipation for a week as well Dr. Smyth got labs recently with ferritin of 19 (down from 40) and hgb 7.4 (down from prior) No overt bleeding  . PRIOR VISIT: Recently started on Inclisiran for HLD Began to notice some changes in bowel movements that were made worse with Nexium use She stopped PPI and has been feeling better Started pro-biotic  . PRIOR VISIT: She did not see [facility] due to no medicare acceptance Using prunes.  Did not like benefiber in the past Recently had loose stools after eating bush's beans Does feel some protrusion after bowel movements that requires manual replacement   . PRIOR VISIT: Got sick last week after eating something with cream sauce Got sick at home with a large bowel movement, and felt a protrusion afterwards Since then she has returned to baseline of incomplete BMs  . PRIOR VISIT: Doing well on Nexium once a day No heartburn . Today she reports some intermittent constipation leading to hard bowel movements and occ pre-syncope if she has not had a bowel movement Denies straining with bowel movements Symptoms resolve after bowel movement No issues if she is having regular bowel movements     . PRIOR VISIT: She is feeling very well since changing her Losartan to Amlodipine EGD/Colonoscopy reviewed.  LA C esophagitis was seen She is taking Nexium once a day Colonoscopy unremarkable   . PRIOR VISIT: At last visit we discuss Dexilant, however, she decided to stay with Nexium In March she had an episode of BRBPR.   She was having multiple episodes of loose stool, and then saw blood.   No further episdoes No pain she can recall She is unsure when last colonoscopy     . PRIOR VISIT: Had concerns Nexium was causing loose stools, and tried to stop, however, she is now back on it Interested in Dexilant Started some benefiber    . PRIOR VISIT: Recently had blood work with Dr. Smyth. Reports labs mostly normal Taking Nexium daily Also reports she is taking meds with full glass of water to prevent pill esophagitis Has loose stools depending on her diet Overall, she feels well . PRIOR VISIT: Today she reports doing fairly well. Taking Nexium daily She alternates b/w constipation and diarrhea She does report that a few weeks ago she coughed up blood once PRIOR VISIT: Reports having 3-5 loose stools daily Has kept a food journal. Thinks she has lactose intolerance No incontinence +ve urgency Has had an episode of red blood No eugene BRBPR Tried benefiber for, but this increased her stools . PRIOR VISIT: After last visit, Hgb 9.7, which was stable from hospital discharge. No rectal bleeding Feeling much better Drinking plenty of fluid with pills Taking iron supplement On PPI once daily, but reports she wants to stop the medication as she does not like PPI. She alternates between constipation and loose stools. Keeping a food journal, but has not identified inciting factors. Has not eliminated dairly products. More frequent constipation than loose stools. She had an episode of incontinence after several doses of miralax. . PRIOR VISIT: [facility] GERD, HH, skin cancer, and MI Recently at [facility] with symptomatic anemia EGD with LA D esophagitis. ? reflux vs possible pill esophagitis Barium swallow with moderate HH She started BID PPI and Carafate. Stopped Carafate due to "tongue reaction" She has decreased PPI to once daily due to concerns it can cause diarrhea She has been feeling well since discharge. Avoiding inciting foods. No melena She now feels constipated on once daily PPI Drinking plenty of fluids with pills

## 2024-12-20 ENCOUNTER — TELEPHONE ENCOUNTER (OUTPATIENT)
Dept: URBAN - METROPOLITAN AREA CLINIC 98 | Facility: CLINIC | Age: 84
End: 2024-12-20

## 2024-12-25 LAB
ENDOMYSIAL ANTIBODY IGA: NEGATIVE
IMMUNOGLOBULIN A, QN, SERUM: 307
T-TRANSGLUTAMINASE (TTG) IGA: <2

## 2024-12-27 ENCOUNTER — WEB ENCOUNTER (OUTPATIENT)
Dept: URBAN - METROPOLITAN AREA CLINIC 98 | Facility: CLINIC | Age: 84
End: 2024-12-27

## 2025-01-01 ENCOUNTER — WEB ENCOUNTER (OUTPATIENT)
Dept: URBAN - METROPOLITAN AREA CLINIC 98 | Facility: CLINIC | Age: 85
End: 2025-01-01

## 2025-01-09 ENCOUNTER — TELEPHONE ENCOUNTER (OUTPATIENT)
Dept: URBAN - METROPOLITAN AREA CLINIC 96 | Facility: CLINIC | Age: 85
End: 2025-01-09

## 2025-01-10 ENCOUNTER — OFFICE VISIT (OUTPATIENT)
Dept: URBAN - METROPOLITAN AREA MEDICAL CENTER 28 | Facility: MEDICAL CENTER | Age: 85
End: 2025-01-10

## 2025-02-12 NOTE — PHYSICAL EXAM LYMPHATIC:
[facility]  [address] [place], [place] [id number]  Phone [phone]  Fax [phone]    NAME: VEDA CASTRO  : 1955  CONSULT DATE: 2012  MOST RECENT VISIT: 2024  TODAY'S DATE: 2025    Labs @ [facility] 2024    PROBLEMS.  Normal bone density by DXA 2002, lowest T-score -1.0 at the left femoral neck    BMD decreased [id number] at all sites, lowest T-score -2.5 at the spine      BMD decreased 3694-8759, lowest T-score -2.6 in the spine     R wrist fracture 2017, hard fall on wet floor; healed with good results  Natural menopause ~ age 50 ()  Hypertension  Breast disease (atypical lobular dysplasia) diagnosed 2001  Hypercalciuria, normal 24-h urine calcium for her is 100-250 mg/d    330 mg/d 2012 on no treatment    360 mg/d 2013 with HCTZ 12.5 mg/d      242 mg/d 2013 with HCTZ 25 mg/d    CURRENT MANAGEMENT FOR OSTEOPOROSIS.  Calcium, diet 1050 mg/d + multivitamin 200 mg/d = 1250 mg/d    150 mg/d Milk, 300 mg/d Cheese, 300 mg/d Yogurt, 300 mg/d Other   Vitamin D, multivitamin 1000 IU/d     25-OH D 49 ng/mL 2021  Exercise, walks 3 miles 3-4 x weekly, yoga  Pharmacologic therapy, alendronate 70 mg weekly 2015-10/2020, resumed 2024    HCTZ 12.5 mg/d started 2012, increased to 25 mg/d 2013    PREVIOUS MEDICATIONS FOR OSTEOPOROSIS.   Evista 2013-2014, stopped because of expense  Raloxifene [id number]    OTHER CURRENT MEDICATIONS. Norvasc  OTC MEDICATIONS. Ziga moringa (energy boost)    CHIEF COMPLAINT. Here for followup of osteoporosis and monitoring treatment.  No new related signs and symptoms.    INTERVAL HISTORY: See problem list for chronic/inactive conditions.   She has been taking alendronate correctly and without side effects.  No falls, near-falls or fractures.  She feels well overall. Raloxifene was stopped .     FOR FULL DETAILS OF FAMILY HISTORY, PAST MEDICAL AND SURGICAL  no lymphadenopathy visible

## 2025-02-18 ENCOUNTER — CLAIMS CREATED FROM THE CLAIM WINDOW (OUTPATIENT)
Dept: URBAN - METROPOLITAN AREA MEDICAL CENTER 28 | Facility: MEDICAL CENTER | Age: 85
End: 2025-02-18

## 2025-02-18 PROCEDURE — 99254 IP/OBS CNSLTJ NEW/EST MOD 60: CPT | Performed by: STUDENT IN AN ORGANIZED HEALTH CARE EDUCATION/TRAINING PROGRAM

## 2025-02-19 ENCOUNTER — CLAIMS CREATED FROM THE CLAIM WINDOW (OUTPATIENT)
Dept: URBAN - METROPOLITAN AREA MEDICAL CENTER 28 | Facility: MEDICAL CENTER | Age: 85
End: 2025-02-19

## 2025-02-19 PROCEDURE — 43235 EGD DIAGNOSTIC BRUSH WASH: CPT | Performed by: INTERNAL MEDICINE

## 2025-02-28 ENCOUNTER — TELEPHONE ENCOUNTER (OUTPATIENT)
Dept: URBAN - METROPOLITAN AREA CLINIC 50 | Facility: CLINIC | Age: 85
End: 2025-02-28

## 2025-02-28 ENCOUNTER — LAB OUTSIDE AN ENCOUNTER (OUTPATIENT)
Dept: URBAN - METROPOLITAN AREA CLINIC 96 | Facility: CLINIC | Age: 85
End: 2025-02-28

## 2025-02-28 ENCOUNTER — OFFICE VISIT (OUTPATIENT)
Dept: URBAN - METROPOLITAN AREA CLINIC 50 | Facility: CLINIC | Age: 85
End: 2025-02-28

## 2025-02-28 VITALS
RESPIRATION RATE: 17 BRPM | WEIGHT: 99 LBS | SYSTOLIC BLOOD PRESSURE: 113 MMHG | BODY MASS INDEX: 18.69 KG/M2 | DIASTOLIC BLOOD PRESSURE: 69 MMHG | TEMPERATURE: 97.9 F | HEART RATE: 80 BPM | HEIGHT: 61 IN

## 2025-02-28 PROBLEM — 87522002: Status: ACTIVE | Noted: 2025-02-28

## 2025-02-28 PROBLEM — 266433003: Status: ACTIVE | Noted: 2025-02-28

## 2025-02-28 LAB
ABSOLUTE BASOPHIL COUNT: 0.06
ABSOLUTE EOSINOPHIL COUNT: 0.23
ABSOLUTE IMMATURE GRANULOCYTE  COUNT: 0.03
ABSOLUTE LYMPHOCYTE COUNT: 1.23
ABSOLUTE MONOCYTE COUNT: 0.68
ABSOLUTE NEUTROPHIL COUNT (ANC): 6.56
ABSOLUTE NRBC  COUNT: 0
BASOPHIL AUTO: 1
EOS AUTO: 3
HCT: 31.3
HGB: 9.9
IMMATURE GRANULOCYTES AUTO: 0.3
LYMPH AUTO: 14
MCH: 31
MCHC: 31.6
MCV: 98.1
MONO AUTO: 8
MPV: 10.5
NEUTRO AUTO: 75
NRBC AUTO: 0
PERFORMING LAB: (no result)
PERFORMING LAB: (no result)
PLATELETS: 330
RBC: 3.19
RDW: 19.3
WBC: 8.8

## 2025-02-28 RX ORDER — MOMETASONE FUROATE AND FORMOTEROL FUMARATE DIHYDRATE 100; 5 UG/1; UG/1
AEROSOL RESPIRATORY (INHALATION)
Qty: 0 | Refills: 0 | Status: ACTIVE | COMMUNITY

## 2025-02-28 RX ORDER — VALACYCLOVIR HCL 500 MG
TABLET ORAL
Qty: 0 | Refills: 0 | Status: ACTIVE | COMMUNITY

## 2025-02-28 RX ORDER — FAMOTIDINE 40 MG/1
1 TABLET TABLET, FILM COATED ORAL TWICE A DAY
Qty: 180 TABLET | Refills: 3 | Status: ON HOLD | COMMUNITY

## 2025-02-28 RX ORDER — FLUTICASONE FUROATE AND VILANTEROL TRIFENATATE 100; 25 UG/1; UG/1
1 PUFF POWDER RESPIRATORY (INHALATION) ONCE A DAY
Status: ON HOLD | COMMUNITY

## 2025-02-28 NOTE — PHYSICAL EXAM GASTROINTESTINAL
Abdomen , soft, nontender, nondistended , no guarding or rigidity , no masses palpable , normal bowel sounds , Liver and Spleen,  no hepatosplenomegaly , liver nontender, Rectal, normal sphincter tone, no rectal masses or bleeding present; nonbleeding internal hemorrhoid noted

## 2025-02-28 NOTE — HPI-TODAY'S VISIT:
83 yo F of Dr. Stephanie Smyth here for f/u after recent hospitalization [facility] 2/17-19 2025 Seen w christoph Initially sent from cardiologist's office 2/2 dizziness/weakness and several days of abdominal distress Admitted w Hgb 6.2/20.9 During hospitalization, she recieved 2 doses pRBC and 1 dose IV venofer She underwent EGD during hospitalization reveling large paraesophageal hernia and reflux esophagitis w/o bleeding She was discharged given improvement in syx and labs after appropriate workup w instruction to continue PO iron H/H improved to 8.3/25.9 at time of discharge  States since discharge, tried twice daily iron but didn't tolerate w incr constipation - now on once daily Also tried twice daily pantoprazole but didn't tolerate this - felt couldn't function and dizzy and sleepy with this Went back to esomeprazole instead, using this twice daily States concerned now seeing black stool since hospital, not changed since hospital Has now started keeping track of this more - states produced more yesterday than previous Had been black until today - today saw brown stool w brbpr w wiping Wonders if irritated from large BM yesterday No nausea/vomiting, no abd pains Eating good No indigestion/heartburn/dysphagia BMs 2-3x/day in past couple days now No longer w dizziness/lightheaded - still not self but feels progressively improving w this since hospital

## 2025-03-11 ENCOUNTER — OFFICE VISIT (OUTPATIENT)
Dept: URBAN - METROPOLITAN AREA CLINIC 98 | Facility: CLINIC | Age: 85
End: 2025-03-11

## 2025-04-10 ENCOUNTER — OFFICE VISIT (OUTPATIENT)
Dept: URBAN - METROPOLITAN AREA CLINIC 50 | Facility: CLINIC | Age: 85
End: 2025-04-10
Payer: MEDICARE

## 2025-04-10 DIAGNOSIS — K44.9 LARGE HIATAL HERNIA: ICD-10-CM

## 2025-04-10 DIAGNOSIS — D50.0 IRON DEFICIENCY ANEMIA DUE TO CHRONIC BLOOD LOSS: ICD-10-CM

## 2025-04-10 DIAGNOSIS — K20.90 ESOPHAGITIS: ICD-10-CM

## 2025-04-10 DIAGNOSIS — Z09 HOSPITAL DISCHARGE FOLLOW-UP: ICD-10-CM

## 2025-04-10 DIAGNOSIS — R54 ADVANCED AGE: ICD-10-CM

## 2025-04-10 PROBLEM — 724556004: Status: ACTIVE | Noted: 2025-04-10

## 2025-04-10 PROBLEM — 49808004: Status: ACTIVE | Noted: 2025-04-10

## 2025-04-10 PROCEDURE — 99214 OFFICE O/P EST MOD 30 MIN: CPT | Performed by: INTERNAL MEDICINE

## 2025-04-10 NOTE — HPI-TODAY'S VISIT:
86 yo WF Taking Nexium and iron twice daily Feels good No swallowing issues Stools dark from iron No abd pain Feels much better since hospital Have skin issues, no GI issues Had blood work w/ PCP

## 2025-04-14 ENCOUNTER — OFFICE VISIT (OUTPATIENT)
Dept: URBAN - METROPOLITAN AREA CLINIC 96 | Facility: CLINIC | Age: 85
End: 2025-04-14